# Patient Record
Sex: MALE | URBAN - METROPOLITAN AREA
[De-identification: names, ages, dates, MRNs, and addresses within clinical notes are randomized per-mention and may not be internally consistent; named-entity substitution may affect disease eponyms.]

---

## 2018-09-27 ENCOUNTER — TELEPHONE (OUTPATIENT)
Dept: FAMILY MEDICINE CLINIC | Age: 48
End: 2018-09-27

## 2018-09-27 DIAGNOSIS — E74.39 GLUCOSE INTOLERANCE: Primary | ICD-10-CM

## 2018-10-17 LAB
EST. AVERAGE GLUCOSE BLD GHB EST-MCNC: 126 MG/DL
HBA1C MFR BLD: 6 % (ref 4.8–5.6)

## 2018-10-24 ENCOUNTER — OFFICE VISIT (OUTPATIENT)
Dept: FAMILY MEDICINE CLINIC | Age: 48
End: 2018-10-24

## 2018-10-24 VITALS
TEMPERATURE: 98.3 F | RESPIRATION RATE: 16 BRPM | DIASTOLIC BLOOD PRESSURE: 85 MMHG | OXYGEN SATURATION: 96 % | HEART RATE: 86 BPM | HEIGHT: 72 IN | SYSTOLIC BLOOD PRESSURE: 160 MMHG | WEIGHT: 239 LBS | BODY MASS INDEX: 32.37 KG/M2

## 2018-10-24 DIAGNOSIS — Z23 ENCOUNTER FOR IMMUNIZATION: ICD-10-CM

## 2018-10-24 DIAGNOSIS — E11.9 CONTROLLED TYPE 2 DIABETES MELLITUS WITHOUT COMPLICATION, WITHOUT LONG-TERM CURRENT USE OF INSULIN (HCC): Primary | ICD-10-CM

## 2018-10-24 DIAGNOSIS — E78.00 HYPERCHOLESTEREMIA: ICD-10-CM

## 2018-10-24 DIAGNOSIS — I10 ESSENTIAL HYPERTENSION: ICD-10-CM

## 2018-10-24 PROBLEM — L71.9 ROSACEA: Status: ACTIVE | Noted: 2018-10-24

## 2018-10-24 RX ORDER — ROSUVASTATIN CALCIUM 5 MG/1
5 TABLET, COATED ORAL DAILY
Qty: 90 TAB | Refills: 1 | Status: SHIPPED | OUTPATIENT
Start: 2018-10-24 | End: 2019-06-03

## 2018-10-24 RX ORDER — METFORMIN HYDROCHLORIDE 500 MG/1
TABLET ORAL 2 TIMES DAILY WITH MEALS
COMMUNITY
End: 2018-12-27

## 2018-10-24 NOTE — PROGRESS NOTES
Yeyo Johnson is a 46 y.o. male presenting for DM2 and HTN Diabetes type: Type 2-oral agents Medications:  Metformin 500 mg 2 QD Medication compliance:    good, never misses Side effects of meds:  none Diet compliance:  Good compliance Exercise compliance:  yes Home testing:  no 
Fasting sugar range:  NA Other sugars later in day:  NA Hypoglycemia:  NA Last eye appt and physician:  due Checking feet:  yes Comorbid conditions:  HTN, 
 
Lab Results Component Value Date/Time Hemoglobin A1c 6.0 (H) 10/16/2018 09:12 AM  
 
 
HTN The patient presents today for HTN follow-up. Currently taking nothing. Taking medications daily w/o complications. Home BP readings range from OK at ortho. SIde effects of meds:  NA Comorbid conditions:  DM Aspirin?  no  
 
ROS Past Medical History:  
Diagnosis Date  Diabetes ([facility] [address] Past Surgical History:  
Procedure Laterality Date  HX COLONOSCOPY  2018 Family History Problem Relation Age of Onset  No Known Problems Mother  Cancer Father   
     prostate Social History Socioeconomic History  Marital status: UNKNOWN Spouse name: Not on file  Number of children: Not on file  Years of education: Not on file  Highest education level: Not on file Social Needs  Financial resource strain: Not on file  Food insecurity - worry: Not on file  Food insecurity - inability: Not on file  Transportation needs - medical: Not on file  Transportation needs - non-medical: Not on file Occupational History  Not on file Tobacco Use  Smoking status: Former Smoker Types: Cigarettes Last attempt to quit: 10/24/2013 Years since quittin.0  Smokeless tobacco: Never Used Substance and Sexual Activity  Alcohol use: Yes  Drug use: No  
 Sexual activity: Not on file Other Topics Concern  Not on file Social History Narrative  Not on file Current Outpatient Medications Medication Sig Dispense Refill  rosuvastatin (CRESTOR) 5 mg tablet Take 1 Tab by mouth daily. 90 Tab 1  
 metFORMIN (GLUCOPHAGE) 500 mg tablet Take  by mouth two (2) times daily (with meals). No Known Allergies Vitals:  
 10/24/18 1443 BP: 160/85 Pulse: 86 Resp: 16 Temp: 98.3 °F (36.8 °C) TempSrc: Oral  
SpO2: 96% Weight: 239 lb (108.4 kg) Height: 6' (1.829 m) Body mass index is 32.41 kg/m². Objective General: Patient alert and oriented and in NAD HEENT: PER/EOMI, no conjunctival pallor or scleral icterus. No thyromegaly or cervical lymphadenopathy Heart: Regular rate and rhythm, No murmurs, rubs or gallops. Lungs: Clear to auscultation bilaterally, no wheezing, rales or rhonchi 
  
Diabetic Foot Exam: 
Protective sensation by monofilament is intact bilaterally. Pedal pulses are 2+ and normal bilaterally. L foot skin inspection:  normal skin and soft tissue with no gross edema or evidence of acute injury or foot ulcer R foot skin inspection:  skin and soft tissue appear normal with no significant edema or evidence of acute injury or foot ulcer Ext: No edema Skin: No rashes or lesions noted on exposed skin Neuro: AAOx3 Psych: Appropriate mood and affect Assessment and Plan: 1. Controlled type 2 diabetes mellitus without complication, without long-term current use of insulin ([address] Good control on metformin only,  Diet again discussed, no sugar, lower carb with 12 hour overnight fast and 45 min of exercise daily 
-  DIABETES FOOT EXAM 
- METABOLIC PANEL, BASIC 2. Essential hypertension Not at goal.  Given BP parameters. Check home BP and see us if not at 140/90 or less - MICROALBUMIN, UR, RAND W/ MICROALB/CREAT RATIO 3. Encounter for immunization Given flu shot 
- INFLUENZA VIRUS VAC QUAD,SPLIT,PRESV FREE SYRINGE IM 4. Hypercholesteremia Start crestor 5 mg per day and FU labs after that - LIPID PANEL 
 
 Diagnosis and plan discussed with patient who verbillized understanding. Follow-up Disposition: 
Return in about 6 months (around 4/24/2019).  
 
[facility]

## 2018-10-24 NOTE — PROGRESS NOTES
Identified pt with two pt identifiers(name and ). Reviewed record in preparation for visit and have obtained necessary documentation. Chief Complaint Patient presents with  Diabetes Health Maintenance Due Topic  LIPID PANEL Q1   
 FOOT EXAM Q1   
 MICROALBUMIN Q1   
 EYE EXAM RETINAL OR DILATED Q1   
 Pneumococcal 19-64 Medium Risk (1 of 1 - PPSV23)  DTaP/Tdap/Td series (1 - Tdap)  Shingrix Vaccine Age 50> (1 of 2)  FOBT Q 1 YEAR AGE 50-75  Influenza Age 5 to Adult Coordination of Care Questionnaire: 
:  
1) Have you been to an emergency room, urgent care, or hospitalized since your last visit?   no If yes, where when, and reason for visit? 2. Have seen or consulted any other health care provider since your last visit? YES If yes, where when, and reason for visit? [facility] 3) Do you have an Advanced Directive/ Living Will in place? NO If yes, do we have a copy on file NO If no, would you like information NO Patient is accompanied by self I have received verbal consent from Rama August to discuss any/all medical information while they are present in the room.

## 2019-05-28 ENCOUNTER — TELEPHONE (OUTPATIENT)
Dept: FAMILY MEDICINE CLINIC | Age: 49
End: 2019-05-28

## 2019-05-28 DIAGNOSIS — E78.00 HYPERCHOLESTEREMIA: ICD-10-CM

## 2019-05-28 DIAGNOSIS — Z12.5 PROSTATE CANCER SCREENING: ICD-10-CM

## 2019-05-28 DIAGNOSIS — I10 ESSENTIAL HYPERTENSION: ICD-10-CM

## 2019-05-28 DIAGNOSIS — E11.9 CONTROLLED TYPE 2 DIABETES MELLITUS WITHOUT COMPLICATION, WITHOUT LONG-TERM CURRENT USE OF INSULIN (HCC): Primary | ICD-10-CM

## 2019-05-28 NOTE — TELEPHONE ENCOUNTER
Patient calling to see if he can get blood work ordered prior to appt on Monday (06/03/19) for his annual physical.  Can you order this?

## 2019-05-30 LAB
ALBUMIN SERPL-MCNC: 5.1 G/DL (ref 3.5–5.5)
ALBUMIN/CREAT UR: <6.5 MG/G CREAT (ref 0–30)
ALP SERPL-CCNC: 65 IU/L (ref 39–117)
ALT SERPL-CCNC: 16 IU/L (ref 0–44)
AST SERPL-CCNC: 19 IU/L (ref 0–40)
BASOPHILS # BLD AUTO: 0 X10E3/UL (ref 0–0.2)
BASOPHILS NFR BLD AUTO: 0 %
BILIRUB DIRECT SERPL-MCNC: 0.17 MG/DL (ref 0–0.4)
BILIRUB SERPL-MCNC: 0.6 MG/DL (ref 0–1.2)
BUN SERPL-MCNC: 16 MG/DL (ref 6–24)
BUN/CREAT SERPL: 18 (ref 9–20)
CALCIUM SERPL-MCNC: 9.3 MG/DL (ref 8.7–10.2)
CHLORIDE SERPL-SCNC: 102 MMOL/L (ref 96–106)
CHOLEST SERPL-MCNC: 162 MG/DL (ref 100–199)
CO2 SERPL-SCNC: 24 MMOL/L (ref 20–29)
CREAT SERPL-MCNC: 0.87 MG/DL (ref 0.76–1.27)
CREAT UR-MCNC: 46.5 MG/DL
EOSINOPHIL # BLD AUTO: 0.1 X10E3/UL (ref 0–0.4)
EOSINOPHIL NFR BLD AUTO: 1 %
ERYTHROCYTE [DISTWIDTH] IN BLOOD BY AUTOMATED COUNT: 13.2 % (ref 12.3–15.4)
EST. AVERAGE GLUCOSE BLD GHB EST-MCNC: 128 MG/DL
GLUCOSE SERPL-MCNC: 99 MG/DL (ref 65–99)
HBA1C MFR BLD: 6.1 % (ref 4.8–5.6)
HCT VFR BLD AUTO: 42.7 % (ref 37.5–51)
HDLC SERPL-MCNC: 47 MG/DL
HGB BLD-MCNC: 14.2 G/DL (ref 13–17.7)
IMM GRANULOCYTES # BLD AUTO: 0 X10E3/UL (ref 0–0.1)
IMM GRANULOCYTES NFR BLD AUTO: 0 %
LDLC SERPL CALC-MCNC: 83 MG/DL (ref 0–99)
LYMPHOCYTES # BLD AUTO: 2.6 X10E3/UL (ref 0.7–3.1)
LYMPHOCYTES NFR BLD AUTO: 33 %
MCH RBC QN AUTO: 31.1 PG (ref 26.6–33)
MCHC RBC AUTO-ENTMCNC: 33.3 G/DL (ref 31.5–35.7)
MCV RBC AUTO: 93 FL (ref 79–97)
MICROALBUMIN UR-MCNC: <3 UG/ML
MONOCYTES # BLD AUTO: 0.7 X10E3/UL (ref 0.1–0.9)
MONOCYTES NFR BLD AUTO: 9 %
NEUTROPHILS # BLD AUTO: 4.4 X10E3/UL (ref 1.4–7)
NEUTROPHILS NFR BLD AUTO: 57 %
PLATELET # BLD AUTO: 222 X10E3/UL (ref 150–450)
POTASSIUM SERPL-SCNC: 4.3 MMOL/L (ref 3.5–5.2)
PROT SERPL-MCNC: 7.3 G/DL (ref 6–8.5)
PSA SERPL-MCNC: 0.8 NG/ML (ref 0–4)
RBC # BLD AUTO: 4.57 X10E6/UL (ref 4.14–5.8)
SODIUM SERPL-SCNC: 142 MMOL/L (ref 134–144)
TRIGL SERPL-MCNC: 161 MG/DL (ref 0–149)
TSH SERPL DL<=0.005 MIU/L-ACNC: 1.81 UIU/ML (ref 0.45–4.5)
VLDLC SERPL CALC-MCNC: 32 MG/DL (ref 5–40)
WBC # BLD AUTO: 7.9 X10E3/UL (ref 3.4–10.8)

## 2019-06-03 ENCOUNTER — OFFICE VISIT (OUTPATIENT)
Dept: FAMILY MEDICINE CLINIC | Age: 49
End: 2019-06-03

## 2019-06-03 VITALS
HEIGHT: 73 IN | HEART RATE: 57 BPM | BODY MASS INDEX: 32.07 KG/M2 | OXYGEN SATURATION: 99 % | DIASTOLIC BLOOD PRESSURE: 90 MMHG | WEIGHT: 242 LBS | RESPIRATION RATE: 16 BRPM | SYSTOLIC BLOOD PRESSURE: 155 MMHG | TEMPERATURE: 98.1 F

## 2019-06-03 DIAGNOSIS — E78.00 HYPERCHOLESTEREMIA: ICD-10-CM

## 2019-06-03 DIAGNOSIS — Z00.00 ANNUAL PHYSICAL EXAM: ICD-10-CM

## 2019-06-03 DIAGNOSIS — E11.9 CONTROLLED TYPE 2 DIABETES MELLITUS WITHOUT COMPLICATION, WITHOUT LONG-TERM CURRENT USE OF INSULIN (HCC): ICD-10-CM

## 2019-06-03 DIAGNOSIS — Z23 ENCOUNTER FOR IMMUNIZATION: Primary | ICD-10-CM

## 2019-06-03 DIAGNOSIS — I10 ESSENTIAL HYPERTENSION: ICD-10-CM

## 2019-06-03 RX ORDER — METFORMIN HYDROCHLORIDE 500 MG/1
TABLET, EXTENDED RELEASE ORAL
Qty: 180 TAB | Refills: 1 | Status: SHIPPED | OUTPATIENT
Start: 2019-06-03 | End: 2019-10-21 | Stop reason: SDUPTHER

## 2019-06-03 RX ORDER — LISINOPRIL 10 MG/1
10 TABLET ORAL DAILY
Qty: 90 TAB | Refills: 1 | Status: SHIPPED | OUTPATIENT
Start: 2019-06-03 | End: 2019-12-30

## 2019-06-03 RX ORDER — LOVASTATIN 20 MG/1
20 TABLET ORAL
Qty: 90 TAB | Refills: 1 | Status: SHIPPED | OUTPATIENT
Start: 2019-06-03 | End: 2019-12-30

## 2019-06-03 NOTE — PATIENT INSTRUCTIONS
STOP the SUGAR    The first step in dietary efforts at weight loss is removing as much sugar from the diet as possible. Most dietary sugar is in the forms of table sugar (sucrose), fruit sugar (fructose) and milk sugar (lactose). But, as the picture above demonstrates, you need to watch labels to see if processed foods have added sugars under other names. To eliminate sucrose, eliminate sweet drinks entirely including soft drinks, sports drinks, lemonade, sweet tea and wine. Also, eliminate candy and make desserts a \"special occasion only treat\". Don't eat desserts with every meal or every night. Most fructose is found in fruit and this should be markedly limited. Fruit juice should be avoided. One piece of fruit daily is the limit. Berries are a good choice to eat as a garnish for other foods. Bananas and grapes should be avoided. Avoid high fructose corn syrup (an artificial sweetener). Milk sugar, or lactose, should be avoided as well. Milk is a good source of calcium but not for people struggling with weight issues. Put a little milk in your coffee or tea but otherwise avoid milk. How can you avoid sugar? For starters, don't buy it and don't bring it in the house. It won't tempt you that way! For more information:    Try the internet for videos about sugar addiction or try diet doctor.com. Carb Counting in Diabetes    Carbohydrate or carb counting is a method of calculating grams of carbohydrate consumed at meals and snacks. Foods that contain carbohydrate have the greatest effect on blood sugar compared to foods that contain protein or fat. A low carb diet has the greatest likelihood of promoting weight loss. What Foods Have Carbohydrate?   Foods that contain carbohydrate or carbs are:    -grains like wheat, rice, oatmeal, and barley  -grain-based foods like bread, cereal, pasta, and crackers  -starchy vegetables like potatoes, peas and corn  -fruit and juice  -milk and yogurt  -dried beans and peas and soy products like veggie burgers  -sweets and snack foods like sodas, juice drinks, cake, cookies, candy, and chips    Non-starchy vegetables like lettuce, cucumbers, broccoli, and cauliflower have very little carbohydrate and minimal impact on your blood glucose. Carbohydrate Servings    -In meal planning, 1 serving of a food with carbohydrate has about  15 grams of carbohydrate:  -Check serving sizes with measuring cups and spoons or a food scale. -Read the Nutrition Facts on food labels to find out how many grams of carbohydrate are in foods you eat.   -1 carb serving (15 grams) is roughly equal to one piece of bread    How much carbohydrate should I eat? Diabetics are advised to eat: For women-30-45 grams or 2-3 carb servings per meal.  For men-45-60 grams or 3-4 carb servings per meal.    For weight loss, patients should try to eat under 100 grams of carbs per day. How do I \"manage\" carbs?    -First, eliminate sugar in the form of soft drinks, candy and desserts. Minimize cakes, cookies, smoothies and juices.  -Next, identify the carbs you are eating. Watch labels and know when you are eating a starch. Eat less bread, noodles, pasta, rice, potatoes, french fries, cereals, chips, crackers and yogurt  -Eat more healthy proteins and fats with more eggs, full fat dairy products, non starchy vegetables, salads, meat, poultry, fish, cheese, berries, nuts, olive oil and butter.  -Avoid beer. Europeans refer to beer as \"liquid bread\" and it is made from grains with a high carb content. Where can I find more information? There is a lot of information about carb counting on the internet and there are books about carb counting. Try the American Diabetes Association and Dietdoctor. com        EXERCISE AND WEIGHT LOSS    You'll hear lots of different things about weight loss and exercise. There is controversy about how much exercise helps with weight loss.   We'll review what you should do about exercise and a weight plan here. First, it is hard to lose weight just with exercise. It takes about 3500 extra calories burned to lose a single pound. To put exercise in perspective, most people burn about 150 calories per mile if they walk or run. Doing the math, it takes over 23 miles to burn up the energy to lose one pound. So if you want to lose weight, exercise by itself is unlikely to help with weight loss very much. You need to work on diet and exercise at the same time to lose weight. And, you need to work on your habits and emotions since they have huge impacts on eating behaviors. But, exercise does help with weight loss. If you exercise, you burn stored fat in your muscles and that allows the levels of insulin in your body to fall. This allows the enzyme that burns fat to \"switch on\" and use stored fat for energy. That combined with a no sugar, lower starch diet combines to promote weight loss. Think of it this way:  exercise permits weight loss! Most people that lose weight and keep it off exercise. What's the right exercise? The best exercise is the one you enjoy and can keep doing! Exercise that makes you feel good physically and makes you feel good about yourself is ideal.      Exercise that elevates your heart rate for a sustained period such as running, biking,  walking and swimming improves your cardiovascular fitness. Resistance exercises such as weight lifting, strength training or calisthenics also clearly improve cardiovascular health and weight control. Stretching and yoga help with flexibility and balance. Ideally, an exercise program should include all of these different types of exercise. How much should I exercise? For weight loss, you should aim to exercise 45 minutes per day, 5-6 days per week.   When you exercise 45 minutes, you exhaust the supply of fat your muscles store for energy and you help you body turn on the enzyme that burns stored fat elsewhere. This is the minimum amount of exercise you should shoot for. Remember, you don't need to think of exercise as strictly an organized period of time where that's all you do. You can increase your exercise in all your daily activities. Walk more at work or school. If you can complete an errand by walking instead of driving, do it. Park farther away from the store if you go shopping and force yourself to walk farther. On breaks at work, walk around the office and greet your coworkers instead of sitting at your desk. Matthias Sa a few calories and enjoy the company of others. Go for a walk around the sports field while the kids practice sports. Take another parent with you. What are other benefits of exercise? While exercise helps you lose weight, it is helping you in lots of other ways. Exercise lowers your risk of diabetes and high blood pressure and makes your heart healthier. It lowers your risk of heart attacks. Exercise can help chronic lung disease and congestive heart failure improve. Exercise lowers the risk of dementia including Alzheimer's disease. Exercise lowers the risk of some cancers and decreases the risk of osteoporosis. And interestingly, exercise helps with emotionally health and lowers the risk and severity of emotional illnesses such as depression. Said simply, exercise helps you live longer and better. What will help me keep up the exercise? Remember that exercise is your gift to yourself and your family. So schedule time for your exercise and be selfish about guarding that time. It's yours! Exercise with a friend or friends and make exercise a social activity that you look forward to. Friends keep each other honest and accountable. Think of how you feel when you exercise. Most people just feel better physically and emotionally. Remember that feeling and try to recapture it.       Remember exercise will help you live longer and better and will help you be there for your children and grandchildren. Make that commitment for your family. And don't forget to tell yourself that while you feel better you look better! Silvestre Jimenez said it:  \"You look marvelous! \"          LOSE WEIGHT WHILE YOU SLEEP    Fasting    Fasting is part of a weight loss program.  Really?!?  Yes. Fasting has been part of the human condition forever. In our modern society, many of us rarely miss a meal but our ancestors often faced prolonged periods of food scarcity so our bodies store calories as fat for that possibility. Fasting, even for a short period, helps us lose weight by burning stored sugar and fat in our liver and \"switching on\" the enzymes that help us burn stored fat. How often should I fast?    You should fast every night! It's important to give our systems a rest from eating and we should fast every night between our evening meal and breakfast.  You should try to have at least 12 hours every night where you aren't eating at all. Longer fasts    You can consider longer periods of fasting to lose weight but first a few cautions. Make sure you stay well hydrated. Drink plenty of water. If you take medications for weight loss or diabetes, discuss fasting with your doctor first.        Sleep    You can lose weight while you sleep! It makes sense if you think about it. When you sleep, the body's machinery is still running and you aren't taking in any additional calories. And, research shows that good sleeping habits promote weight loss. Sleep too little and weight loss is more difficult. Ideally for weight loss, you should sleep 7-8 hours each night. Interestingly, if you sleep at a cooler temperature, you lose more weight. You body burns more calories to stay warm. So, what can I eat? 1) Protein-protein consumption promotes weight loss.   Eat protein at every meal.  Good sources of protein include meat, fish, poultry and eggs. 2) More soluble fiber-soluble fiber helps us feel \"full\" and helps improve many markers for cardiovascular disease. But, we have to watch out for products with added sugar or large carb servings! Sources of soluble fiber include oatmeal, root vegetables such as sweet potatoes, turnips and carrots, vegetables such as broccoli and Brussel sprouts. 3) Healthy fats and oils-certain fats are better for our blood vessels and cardiovascular system. The oils in fish and seafood tend to be better for us as well as olive oil and the nuts on trees (walnuts, almonds, pistachios and hazelnuts). So, again, try to eat more fish. Use olive oil for cooking and for salad dressings. Nuts can be used in salads and in other dishes and they make a good low carb snack. 4) Non starchy vegetables-Green and yellow vegetables offer a lot of nutrients and very low amounts of carbs that can lead to weight gain. Salads can add a lot to our diet and also pack in a lot of nutrients. Cautions:  avoid starchy vegetables such as potatoes, corn and peas. Also, be careful about what is added to vegetables such as fruit or salad dressings that contain sugar. 5) Full fat dairy products-Cheeses make a good snack or appetizer. Cottage cheese can be a good basis for breakfast.  Yogurt is a good addition to our diet but watch out for yogurt that has added sugar. Avoid milk.

## 2019-06-03 NOTE — PROGRESS NOTES
Beka Jim is a 44 y.o. male      Chief Complaint   Patient presents with    Complete Physical     Patient is fasting          1. Have you been to the ER, urgent care clinic since your last visit? Hospitalized since your last visit? No      2. Have you seen or consulted any other health care providers outside of the [address] [place] [address] since your last visit? Include any pap smears or colon screening.   No

## 2019-06-03 NOTE — PROGRESS NOTES
Assessment and Plan:    1. Encounter for immunization  updated  - diphtheria-pertussis, acellular,-tetanus (ACELL) 2.5-8-5 Lf-mcg-Lf/0.5mL susp susp; 0.5 mL by IntraMUSCular route once for 1 dose. Dispense: 0.5 mL; Refill: 0    2. Essential hypertension  Start ACE, FU 6 weeks for BP recheck. - lisinopril (PRINIVIL, ZESTRIL) 10 mg tablet; Take 1 Tab by mouth daily. Dispense: 90 Tab; Refill: 1    3. Hypercholesteremia  Retry different statin (Start 10 days after starting ACE)  - lovastatin (MEVACOR) 20 mg tablet; Take 1 Tab by mouth nightly. Dispense: 90 Tab; Refill: 1    4. Controlled type 2 diabetes mellitus without complication, without long-term current use of insulin (HCC)  At goal.  Diet exercise, foot care, all discussed    5. Annual physical exam  Safety discussed, cancer screening up to date      Diagnoses and plans were discussed with the patient. After visit summary given to the patient as well. Tali Goodson MD    Edward Chappell is a 44 y.o. male who had concerns including Complete Physical (Patient is fasting ). DM2 on metformin  Doing well A1c good  Does not check own sugars  Following diet  Due to see eye doctor     Cholesterol stopped crestor due to diarrhea    BP up today and on previous visits. Health maintenance:    Immunizations needed: Tdap or DT   Pneumovacc 23   Influenza vaccine    Cancer screening status   Colonoscopy 2017 repeat 5 years   PSA normal   Lung CT past smoker, has quit    AAA screening past smoker, will need in future    Cardiovascular risk factors:   Smoking past, quit 2011   HTN start treatment today   Cholesterol start treatment again today   Diabetes well controlled      Last eye exam  Will go to [facility]  Last dental exam sees regularly. Meds: several just added today.     Current Outpatient Medications:     diphtheria-pertussis, acellular,-tetanus (ACELL) 2.5-8-5 Lf-mcg-Lf/0.5mL susp susp, 0.5 mL by IntraMUSCular route once for 1 dose., Disp: 0.5 mL, Rfl: 0    lisinopril (PRINIVIL, ZESTRIL) 10 mg tablet, Take 1 Tab by mouth daily. , Disp: 90 Tab, Rfl: 1    lovastatin (MEVACOR) 20 mg tablet, Take 1 Tab by mouth nightly., Disp: 90 Tab, Rfl: 1    metFORMIN ER (GLUCOPHAGE XR) 500 mg tablet, TAKE 2 TABLETS BY MOUTH  ONCE DAILY, Disp: 180 Tab, Rfl: 1   Allergies:  No Known Allergies  Smoker:   Social History     Tobacco Use   Smoking Status Current Some Day Smoker    Types: Cigarettes, Pipe    Last attempt to quit: 10/24/2013    Years since quittin.6   Smokeless Tobacco Never Used     ETOH:   Social History     Substance and Sexual Activity   Alcohol Use Yes       FH:    Family History   Problem Relation Age of Onset    No Known Problems Mother     Cancer Father         prostate       ROS:  General/Constitutional:  No headache, fever, fatigue, weight loss or weight gain     Eyes:  No redness, pruritis, pain, visual changes, swelling, or discharge    Ears:  No pain, loss or changes in hearin    Neck:  No swelling, masses, stiffness, pain, or limited movemen    Cardiac:   No chest pain    Respiratory:  No cough or shortness of breath    GI:  No nausea/vomiting, diarrhea, abdominal pain, bloody or dark stools     :  No dysuria or  hematuria   Neurological:  No loss of consciousness, dizziness, seizures, dysarthria, cognitive changes, memory changes,  problems with balance, or unilateral weakness    Skin: No rash         Physical Exam:  Visit Vitals  /90   Pulse (!) 57   Temp 98.1 °F (36.7 °C) (Oral)   Resp 16   Ht 6' 1\" (1.854 m)   Wt 242 lb (109.8 kg)   SpO2 99%   BMI 31.93 kg/m²       Physical Examination: General appearance - alert, well appearing, and in no distress, oriented to person, place, and time and normal appearing weight  Mental status -  normal mood, behavior, speech, dress, motor activity, and thought processes, no expressed suicidal or homicidal ideation, no hallucinations  Ears - bilateral TM's and external ear canals normal  Nose - normal and patent, no erythema, discharge or polyps  Mouth - mucous membranes moist, pharynx normal without lesions  Neck - supple, no significant adenopathy  Chest - normal chest excursion, normal inspiratory and expiratory function. Clear to ausculation bilaterally. Heart - normal rate, regular rhythm, normal S1, S2, no murmurs, rubs, clicks or gallops, no extremity edema  Abdomen- benign, no organomegaly or masses bilateral small hernias, inguinal.  -normal penis and testicles, 2+ prostate  Skin-no rashes or suspicious moles  Heme negative stool  Neuro normal speech, moves all extremities, normal gait  Musculoskeletal- grossly normal joint and motor function.

## 2019-07-15 ENCOUNTER — OFFICE VISIT (OUTPATIENT)
Dept: FAMILY MEDICINE CLINIC | Age: 49
End: 2019-07-15

## 2019-07-15 VITALS
RESPIRATION RATE: 18 BRPM | WEIGHT: 242 LBS | BODY MASS INDEX: 32.07 KG/M2 | DIASTOLIC BLOOD PRESSURE: 82 MMHG | HEIGHT: 73 IN | SYSTOLIC BLOOD PRESSURE: 119 MMHG | TEMPERATURE: 98.3 F | HEART RATE: 64 BPM

## 2019-07-15 DIAGNOSIS — I10 ESSENTIAL HYPERTENSION: Primary | ICD-10-CM

## 2019-07-15 DIAGNOSIS — E78.00 HYPERCHOLESTEREMIA: ICD-10-CM

## 2019-07-15 DIAGNOSIS — E11.9 CONTROLLED TYPE 2 DIABETES MELLITUS WITHOUT COMPLICATION, WITHOUT LONG-TERM CURRENT USE OF INSULIN (HCC): ICD-10-CM

## 2019-07-15 NOTE — PROGRESS NOTES
Troy Pierre is a 44 y.o. male      Chief Complaint   Patient presents with    Medication Refill     Lab ordes          1. Have you been to the ER, urgent care clinic since your last visit? Hospitalized since your last visit? no      2. Have you seen or consulted any other health care providers outside of the [address] [place] [address] since your last visit? Include any pap smears or colon screening.  no

## 2019-07-15 NOTE — PATIENT INSTRUCTIONS
Diabetes: 
Blood sugar goals: 
Hemoglobin A1c under 7 Fasting blood sugar  Blood sugar 2 hours after a meal under 180, 4 hours after a meal under 120 No hypoglycemia (sugars under 70 and symptomatic low sugars) Blood sugar control with diet and exercise: 
Exercise 45 minutes per day. This makes your insulin work better. It also allows insulin levels to fall helping with weight loss. Every night, try to fast from your evening meal to breakfast (at least 12 hours) without eating anything. This uses stored energy in your liver and makes insulin work better. Avoid simples sugars such as table sugar in drinks (sodas, lemonade, sweet tea, wine), desserts, candy. Also avoid fruit juices and high fructose corn syrup. Finally, drink less milk which has milk sugar in it. Control your starch intake. Men should have 3-4 carb portions per meal.  Women should have 2-3 carb portions per meal.  A carb serving is the equivalent of a slice of bread. Control your blood pressure and cholesterol. These problems are common in diabetes. AND, don't smoke. All of these problems contribute to heart disease and stroke risk. Get a yearly eye exam and flu shot. Make sure your vaccines are up to date particularly the pneumonia vaccines. Inspect your feet daily. Wear comfortable protective shoes and clean socks. Seek medical care if there are sore, calluses or numbness and burning of your feet. See your doctor at least every 6 months if you are on oral medicines or more often if the diabetic control is not at goal or if you are on insulin. Take your medicines religiously. The goal blood pressure for most patients is 140/90. The whole point of treating blood pressure is to prevent strokes, heart attacks and kidney damage. Persistently elevated blood pressure damages blood vessels and can lead to catastrophic heart problems and strokes. Recommendations for Hypertension (elevated blood pressure): · Purchase a blood pressure cuff that goes around your upper arm and check blood pressure at least 3 times per week. Write down your numbers for review. Check blood pressure in the morning and evening. Rest for 5 minutes with feet elevated and arm resting on a table (at mid-chest level) when checking blood pressure · Exercise 30-60 minutes most days of the week, preferably with a mix of cardiovascular and strength training. Exercise can improve blood pressure, even if you do not lose weight! · Limit alcohol intake to less than 3-4 drinks per week. · Avoid tobacco products · Avoid illegal drugs especially amphetamines · DASH diet - includes fruits, vegetables, fiber, and less than 2000 mg sodium (salt) daily. · Try to eat a low sugar diet. Sugar worsens diabetes and activates kidney hormones that raise blood pressure. · Avoid non-steroidal inflammatory medications (NSAIDS) such as ibuprofen, advil, motrin, aleve, and naproxyn as these may increase blood pressure if used long-term · Avoid OTC decongestants such as pseudopherine, phenylephrine, Afrin · Take your blood pressure medicine (and aspirin if prescribed) religiously

## 2019-12-09 ENCOUNTER — TELEPHONE (OUTPATIENT)
Dept: FAMILY MEDICINE CLINIC | Age: 49
End: 2019-12-09

## 2019-12-09 DIAGNOSIS — E11.9 CONTROLLED TYPE 2 DIABETES MELLITUS WITHOUT COMPLICATION, WITHOUT LONG-TERM CURRENT USE OF INSULIN (HCC): ICD-10-CM

## 2019-12-09 DIAGNOSIS — I10 ESSENTIAL HYPERTENSION: ICD-10-CM

## 2019-12-09 DIAGNOSIS — E11.9 CONTROLLED TYPE 2 DIABETES MELLITUS WITHOUT COMPLICATION, WITHOUT LONG-TERM CURRENT USE OF INSULIN (HCC): Primary | ICD-10-CM

## 2019-12-28 DIAGNOSIS — E78.00 HYPERCHOLESTEREMIA: ICD-10-CM

## 2019-12-28 DIAGNOSIS — I10 ESSENTIAL HYPERTENSION: ICD-10-CM

## 2019-12-30 RX ORDER — LOVASTATIN 20 MG/1
TABLET ORAL
Qty: 90 TAB | Refills: 1 | Status: SHIPPED | OUTPATIENT
Start: 2019-12-30 | End: 2020-01-13 | Stop reason: SDUPTHER

## 2019-12-30 RX ORDER — LISINOPRIL 10 MG/1
TABLET ORAL
Qty: 90 TAB | Refills: 1 | Status: SHIPPED | OUTPATIENT
Start: 2019-12-30 | End: 2020-01-13 | Stop reason: SDUPTHER

## 2020-01-10 LAB
BASOPHILS # BLD AUTO: 0.1 X10E3/UL (ref 0–0.2)
BASOPHILS NFR BLD AUTO: 1 %
BUN SERPL-MCNC: 13 MG/DL (ref 6–24)
BUN/CREAT SERPL: 13 (ref 9–20)
CALCIUM SERPL-MCNC: 10 MG/DL (ref 8.7–10.2)
CHLORIDE SERPL-SCNC: 100 MMOL/L (ref 96–106)
CO2 SERPL-SCNC: 25 MMOL/L (ref 20–29)
CREAT SERPL-MCNC: 0.99 MG/DL (ref 0.76–1.27)
EOSINOPHIL # BLD AUTO: 0.1 X10E3/UL (ref 0–0.4)
EOSINOPHIL NFR BLD AUTO: 2 %
ERYTHROCYTE [DISTWIDTH] IN BLOOD BY AUTOMATED COUNT: 12.4 % (ref 11.6–15.4)
EST. AVERAGE GLUCOSE BLD GHB EST-MCNC: 131 MG/DL
GLUCOSE SERPL-MCNC: 123 MG/DL (ref 65–99)
HBA1C MFR BLD: 6.2 % (ref 4.8–5.6)
HCT VFR BLD AUTO: 40 % (ref 37.5–51)
HGB BLD-MCNC: 13.9 G/DL (ref 13–17.7)
IMM GRANULOCYTES # BLD AUTO: 0 X10E3/UL (ref 0–0.1)
IMM GRANULOCYTES NFR BLD AUTO: 0 %
LYMPHOCYTES # BLD AUTO: 2.2 X10E3/UL (ref 0.7–3.1)
LYMPHOCYTES NFR BLD AUTO: 41 %
MCH RBC QN AUTO: 31.9 PG (ref 26.6–33)
MCHC RBC AUTO-ENTMCNC: 34.8 G/DL (ref 31.5–35.7)
MCV RBC AUTO: 92 FL (ref 79–97)
MONOCYTES # BLD AUTO: 0.7 X10E3/UL (ref 0.1–0.9)
MONOCYTES NFR BLD AUTO: 14 %
NEUTROPHILS # BLD AUTO: 2.3 X10E3/UL (ref 1.4–7)
NEUTROPHILS NFR BLD AUTO: 42 %
PLATELET # BLD AUTO: 226 X10E3/UL (ref 150–450)
POTASSIUM SERPL-SCNC: 4.7 MMOL/L (ref 3.5–5.2)
RBC # BLD AUTO: 4.36 X10E6/UL (ref 4.14–5.8)
SODIUM SERPL-SCNC: 139 MMOL/L (ref 134–144)
WBC # BLD AUTO: 5.4 X10E3/UL (ref 3.4–10.8)

## 2020-01-13 ENCOUNTER — OFFICE VISIT (OUTPATIENT)
Dept: FAMILY MEDICINE CLINIC | Age: 50
End: 2020-01-13

## 2020-01-13 VITALS
BODY MASS INDEX: 32.84 KG/M2 | WEIGHT: 247.8 LBS | TEMPERATURE: 98.4 F | OXYGEN SATURATION: 97 % | SYSTOLIC BLOOD PRESSURE: 136 MMHG | DIASTOLIC BLOOD PRESSURE: 87 MMHG | HEART RATE: 51 BPM | HEIGHT: 73 IN | RESPIRATION RATE: 16 BRPM

## 2020-01-13 DIAGNOSIS — E78.00 HYPERCHOLESTEREMIA: ICD-10-CM

## 2020-01-13 DIAGNOSIS — I10 ESSENTIAL HYPERTENSION: ICD-10-CM

## 2020-01-13 DIAGNOSIS — E11.9 CONTROLLED TYPE 2 DIABETES MELLITUS WITHOUT COMPLICATION, WITHOUT LONG-TERM CURRENT USE OF INSULIN (HCC): Primary | ICD-10-CM

## 2020-01-13 RX ORDER — METFORMIN HYDROCHLORIDE 500 MG/1
TABLET, EXTENDED RELEASE ORAL
Qty: 180 TAB | Refills: 1 | Status: SHIPPED | OUTPATIENT
Start: 2020-01-13 | End: 2020-04-09

## 2020-01-13 RX ORDER — LOVASTATIN 20 MG/1
TABLET ORAL
Qty: 90 TAB | Refills: 1 | Status: SHIPPED | OUTPATIENT
Start: 2020-01-13 | End: 2020-07-22

## 2020-01-13 RX ORDER — LISINOPRIL 10 MG/1
TABLET ORAL
Qty: 90 TAB | Refills: 1 | Status: SHIPPED | OUTPATIENT
Start: 2020-01-13 | End: 2020-07-22

## 2020-01-13 NOTE — PROGRESS NOTES
Assessment and Plan    1. Hypercholesteremia  Continue statin  - lovastatin (MEVACOR) 20 mg tablet; TAKE 1 TABLET BY MOUTH EVERY NIGHT  Dispense: 90 Tab; Refill: 1    2. Essential hypertension  At goal  - lisinopril (PRINIVIL, ZESTRIL) 10 mg tablet; TAKE 1 TABLET BY MOUTH DAILY  Dispense: 90 Tab; Refill: 1    3. Controlled type 2 diabetes mellitus without complication, without long-term current use of insulin ([address]  Continue work on diet, doing well. - metFORMIN ER (GLUCOPHAGE XR) 500 mg tablet; TAKE 2 TABLETS BY MOUTH  ONCE DAILY  Dispense: 180 Tab; Refill: 1      Follow-up and Dispositions    · Return in about 6 months (around 7/13/2020). Diagnosis and plan discussed with patient who verbillized understanding. History of present Melania Little is a 44 y.o. male presenting for Hypertension and Diabetes    DM2  Sticking to diet  Watching sugars and starches  A1c 6.2   Continues to exercise  Eyes are up to date. No problems with feet. HTN  BP high normal   OK at home    Hypercholesterolemia  Remains on statin. Review of Systems   Respiratory: Negative for shortness of breath. Cardiovascular: Negative for chest pain and palpitations. Musculoskeletal: Negative for joint pain. Skin: Negative for rash. Psychiatric/Behavioral: Negative. Past Medical History:   Diagnosis Date    Diabetes ([address]     Hypercholesteremia     Hypertension     Inguinal hernia     bilateral unrepaired     Past Surgical History:   Procedure Laterality Date    HX COLONOSCOPY  04/2018    Polyp, repeat in 5 years, Dr. Mario Parnell.      Family History   Problem Relation Age of Onset    No Known Problems Mother     Cancer Father         prostate     Social History     Socioeconomic History    Marital status: UNKNOWN     Spouse name: Not on file    Number of children: Not on file    Years of education: Not on file    Highest education level: Not on file   Occupational History    Not on file Social Needs    Financial resource strain: Not on file    Food insecurity:     Worry: Not on file     Inability: Not on file    Transportation needs:     Medical: Not on file     Non-medical: Not on file   Tobacco Use    Smoking status: Current Some Day Smoker     Types: Cigarettes, Pipe     Last attempt to quit: 10/24/2013     Years since quittin.2    Smokeless tobacco: Never Used   Substance and Sexual Activity    Alcohol use: Yes    Drug use: No    Sexual activity: Yes   Lifestyle    Physical activity:     Days per week: Not on file     Minutes per session: Not on file    Stress: Not on file   Relationships    Social connections:     Talks on phone: Not on file     Gets together: Not on file     Attends [employer] service: Not on file     Active member of club or organization: Not on file     Attends meetings of clubs or organizations: Not on file     Relationship status: Not on file    Intimate partner violence:     Fear of current or ex partner: Not on file     Emotionally abused: Not on file     Physically abused: Not on file     Forced sexual activity: Not on file   Other Topics Concern    Not on file   Social History Narrative    Not on file         Prior to Admission medications    Medication Sig Start Date End Date Taking?  Authorizing Provider   metFORMIN ER (GLUCOPHAGE XR) 500 mg tablet TAKE 2 TABLETS BY MOUTH  ONCE DAILY 20  Yes Rudean Aase, MD   lovastatin (MEVACOR) 20 mg tablet TAKE 1 TABLET BY MOUTH EVERY NIGHT 20  Yes Rudean Aase, MD   lisinopril (PRINIVIL, ZESTRIL) 10 mg tablet TAKE 1 TABLET BY MOUTH DAILY 20  Yes Rudean Aase, MD   lisinopril (PRINIVIL, ZESTRIL) 10 mg tablet TAKE 1 TABLET BY MOUTH DAILY 19  Rudean Aase, MD   lovastatin (MEVACOR) 20 mg tablet TAKE 1 TABLET BY MOUTH EVERY NIGHT 19  Rudean Aase, MD   metFORMIN ER (GLUCOPHAGE XR) 500 mg tablet TAKE 2 TABLETS BY MOUTH  ONCE DAILY 10/22/19 1/13/20  Connor Michelle MD        No Known Allergies    Vitals:    01/13/20 1307   BP: 136/87   Pulse: (!) 51   Resp: 16   Temp: 98.4 °F (36.9 °C)   TempSrc: Oral   SpO2: 97%   Weight: 247 lb 12.8 oz (112.4 kg)   Height: 6' 1\" (1.854 m)     Body mass index is 32.69 kg/m². Objective  Physical Exam  Vitals signs and nursing note reviewed. Constitutional:       Appearance: Normal appearance. He is not toxic-appearing. HENT:      Head: Normocephalic and atraumatic. Neck:      Musculoskeletal: No muscular tenderness. Cardiovascular:      Rate and Rhythm: Normal rate and regular rhythm. Heart sounds: Normal heart sounds. No murmur. No gallop. Pulmonary:      Effort: Pulmonary effort is normal. No respiratory distress. Breath sounds: Normal breath sounds. No wheezing, rhonchi or rales. Lymphadenopathy:      Cervical: No cervical adenopathy. Neurological:      Mental Status: He is alert. Psychiatric:         Mood and Affect: Mood normal.         Behavior: Behavior normal.         Thought Content: Thought content normal.         Judgment: Judgment normal.          Diabetic Foot Exam:  Protective sensation is intact bilaterally. Pedal pulses are 2+ and normal bilaterally.   L foot skin inspection:  normal skin and soft tissue with no gross edema or evidence of acute injury or foot ulcer  R foot skin inspection:  skin and soft tissue appear normal with no significant edema or evidence of acute injury or foot ulcer

## 2020-01-13 NOTE — PROGRESS NOTES
Identified pt with two pt identifiers(name and ). Reviewed record in preparation for visit and have obtained necessary documentation. Chief Complaint   Patient presents with    Hypertension    Diabetes        Health Maintenance Due   Topic    EYE EXAM RETINAL OR DILATED     Shingrix Vaccine Age 50> (1 of 2)    FOBT Q 1 YEAR AGE 54-65     DTaP/Tdap/Td series (2 - Td)    Influenza Age 5 to Adult     FOOT EXAM Q1        Coordination of Care Questionnaire:  :   1) Have you been to an emergency room, urgent care, or hospitalized since your last visit? If yes, where when, and reason for visit? No       2. Have seen or consulted any other health care provider since your last visit? If yes, where when, and reason for visit?   No

## 2020-08-24 ENCOUNTER — TELEPHONE (OUTPATIENT)
Dept: FAMILY MEDICINE CLINIC | Age: 50
End: 2020-08-24

## 2020-08-24 DIAGNOSIS — E78.00 HYPERCHOLESTEREMIA: ICD-10-CM

## 2020-08-24 DIAGNOSIS — I10 ESSENTIAL HYPERTENSION: ICD-10-CM

## 2020-08-24 DIAGNOSIS — Z12.5 SCREENING FOR MALIGNANT NEOPLASM OF PROSTATE: Primary | ICD-10-CM

## 2020-08-24 DIAGNOSIS — E11.9 CONTROLLED TYPE 2 DIABETES MELLITUS WITHOUT COMPLICATION, WITHOUT LONG-TERM CURRENT USE OF INSULIN (HCC): ICD-10-CM

## 2020-08-24 NOTE — TELEPHONE ENCOUNTER
Pt would like to have labs prio to visit on 9/1/2020 for Annual     He would like for them to be faxed to [facility] on [id number] [address] [address].  At [phone]

## 2020-08-27 LAB
ALBUMIN SERPL-MCNC: 5 G/DL (ref 3.8–4.9)
ALBUMIN/CREAT UR: <11 MG/G CREAT (ref 0–29)
ALP SERPL-CCNC: 62 IU/L (ref 39–117)
ALT SERPL-CCNC: 26 IU/L (ref 0–44)
AST SERPL-CCNC: 25 IU/L (ref 0–40)
BASOPHILS # BLD AUTO: 0.1 X10E3/UL (ref 0–0.2)
BASOPHILS NFR BLD AUTO: 1 %
BILIRUB DIRECT SERPL-MCNC: 0.11 MG/DL (ref 0–0.4)
BILIRUB SERPL-MCNC: 0.4 MG/DL (ref 0–1.2)
BUN SERPL-MCNC: 20 MG/DL (ref 6–24)
BUN/CREAT SERPL: 23 (ref 9–20)
CALCIUM SERPL-MCNC: 10 MG/DL (ref 8.7–10.2)
CHLORIDE SERPL-SCNC: 101 MMOL/L (ref 96–106)
CHOLEST SERPL-MCNC: 193 MG/DL (ref 100–199)
CO2 SERPL-SCNC: 26 MMOL/L (ref 20–29)
CREAT SERPL-MCNC: 0.88 MG/DL (ref 0.76–1.27)
CREAT UR-MCNC: 28.5 MG/DL
EOSINOPHIL # BLD AUTO: 0.1 X10E3/UL (ref 0–0.4)
EOSINOPHIL NFR BLD AUTO: 2 %
ERYTHROCYTE [DISTWIDTH] IN BLOOD BY AUTOMATED COUNT: 12.5 % (ref 11.6–15.4)
EST. AVERAGE GLUCOSE BLD GHB EST-MCNC: 134 MG/DL
GLUCOSE SERPL-MCNC: 150 MG/DL (ref 65–99)
HBA1C MFR BLD: 6.3 % (ref 4.8–5.6)
HCT VFR BLD AUTO: 42.4 % (ref 37.5–51)
HDLC SERPL-MCNC: 45 MG/DL
HGB BLD-MCNC: 14.2 G/DL (ref 13–17.7)
IMM GRANULOCYTES # BLD AUTO: 0 X10E3/UL (ref 0–0.1)
IMM GRANULOCYTES NFR BLD AUTO: 1 %
LDLC SERPL CALC-MCNC: 99 MG/DL (ref 0–99)
LYMPHOCYTES # BLD AUTO: 2.2 X10E3/UL (ref 0.7–3.1)
LYMPHOCYTES NFR BLD AUTO: 32 %
MCH RBC QN AUTO: 31.1 PG (ref 26.6–33)
MCHC RBC AUTO-ENTMCNC: 33.5 G/DL (ref 31.5–35.7)
MCV RBC AUTO: 93 FL (ref 79–97)
MICROALBUMIN UR-MCNC: <3 UG/ML
MONOCYTES # BLD AUTO: 0.8 X10E3/UL (ref 0.1–0.9)
MONOCYTES NFR BLD AUTO: 12 %
NEUTROPHILS # BLD AUTO: 3.6 X10E3/UL (ref 1.4–7)
NEUTROPHILS NFR BLD AUTO: 52 %
PLATELET # BLD AUTO: 207 X10E3/UL (ref 150–450)
POTASSIUM SERPL-SCNC: 4.5 MMOL/L (ref 3.5–5.2)
PROT SERPL-MCNC: 7.4 G/DL (ref 6–8.5)
PSA SERPL-MCNC: 0.7 NG/ML (ref 0–4)
RBC # BLD AUTO: 4.57 X10E6/UL (ref 4.14–5.8)
SODIUM SERPL-SCNC: 138 MMOL/L (ref 134–144)
TRIGL SERPL-MCNC: 246 MG/DL (ref 0–149)
VLDLC SERPL CALC-MCNC: 49 MG/DL (ref 5–40)
WBC # BLD AUTO: 6.8 X10E3/UL (ref 3.4–10.8)

## 2020-09-02 ENCOUNTER — OFFICE VISIT (OUTPATIENT)
Dept: FAMILY MEDICINE CLINIC | Age: 50
End: 2020-09-02
Payer: COMMERCIAL

## 2020-09-02 VITALS
BODY MASS INDEX: 32.92 KG/M2 | WEIGHT: 248.4 LBS | HEART RATE: 60 BPM | HEIGHT: 73 IN | SYSTOLIC BLOOD PRESSURE: 117 MMHG | RESPIRATION RATE: 16 BRPM | TEMPERATURE: 98.5 F | OXYGEN SATURATION: 97 % | DIASTOLIC BLOOD PRESSURE: 74 MMHG

## 2020-09-02 DIAGNOSIS — E11.9 CONTROLLED TYPE 2 DIABETES MELLITUS WITHOUT COMPLICATION, WITHOUT LONG-TERM CURRENT USE OF INSULIN (HCC): ICD-10-CM

## 2020-09-02 DIAGNOSIS — I10 ESSENTIAL HYPERTENSION: ICD-10-CM

## 2020-09-02 DIAGNOSIS — Z00.00 ANNUAL PHYSICAL EXAM: Primary | ICD-10-CM

## 2020-09-02 DIAGNOSIS — E78.00 HYPERCHOLESTEREMIA: ICD-10-CM

## 2020-09-02 PROCEDURE — 99396 PREV VISIT EST AGE 40-64: CPT | Performed by: FAMILY MEDICINE

## 2020-09-02 PROCEDURE — 99213 OFFICE O/P EST LOW 20 MIN: CPT | Performed by: FAMILY MEDICINE

## 2020-09-02 RX ORDER — LISINOPRIL 10 MG/1
TABLET ORAL
Qty: 90 TAB | Refills: 1 | Status: SHIPPED | OUTPATIENT
Start: 2020-09-02 | End: 2021-05-05 | Stop reason: SDUPTHER

## 2020-09-02 RX ORDER — LOVASTATIN 20 MG/1
TABLET ORAL
Qty: 90 TAB | Refills: 1 | Status: SHIPPED | OUTPATIENT
Start: 2020-09-02 | End: 2021-03-05

## 2020-09-02 RX ORDER — METFORMIN HYDROCHLORIDE 500 MG/1
1000 TABLET, EXTENDED RELEASE ORAL DAILY
Qty: 180 TAB | Refills: 1 | Status: SHIPPED | OUTPATIENT
Start: 2020-09-02 | End: 2020-10-12 | Stop reason: ALTCHOICE

## 2020-09-02 NOTE — PATIENT INSTRUCTIONS
Diabetes: 
Blood sugar goals: 
Hemoglobin A1c under 7 Fasting blood sugar  Blood sugar 2 hours after a meal under 180, 4 hours after a meal under 120 No hypoglycemia (sugars under 70 and symptomatic low sugars) Blood sugar control with diet and exercise: 
Exercise 45 minutes per day. This makes your insulin work better. It also allows insulin levels to fall helping with weight loss. Every night, try to fast from your evening meal to breakfast (at least 12 hours) without eating anything. This uses stored energy in your liver and makes insulin work better. Avoid simples sugars such as table sugar in drinks (sodas, lemonade, sweet tea, wine), desserts, candy. Also avoid fruit juices and high fructose corn syrup. Avoid frequent consumption of fruit especially grapes and bananas. A single serving of fruit daily is all you should have. Finally, drink less milk which has milk sugar in it. Control your starch intake. Men should have 3-4 carb portions per meal.  Women should have 2-3 carb portions per meal.  A carb serving is the equivalent of a slice of bread. Control your blood pressure and cholesterol. These problems are common in diabetes. AND, don't smoke. All of these problems contribute to heart disease and stroke risk. Get a yearly eye exam and flu shot. Make sure your vaccines are up to date particularly the pneumonia vaccines. Inspect your feet daily. Wear comfortable protective shoes and clean socks. Seek medical care if there are sore, calluses or numbness and burning of your feet. See your doctor at least every 6 months if you are on oral medicines or more often if the diabetic control is not at goal or if you are on insulin. Take your medicines religiously. STOP the SUGAR The first step in dietary efforts at weight loss is removing as much sugar from the diet as possible.   Most dietary sugar is in the forms of table sugar (sucrose), fruit sugar (fructose) and milk sugar (lactose). But, as the picture above demonstrates, you need to watch labels to see if processed foods have added sugars under other names. To eliminate sucrose, eliminate sweet drinks entirely including soft drinks, sports drinks, lemonade, sweet tea and wine. Also, eliminate candy and make desserts a \"special occasion only treat\". Don't eat desserts with every meal or every night. Most fructose is found in fruit and this should be markedly limited. Fruit juice should be avoided. One piece of fruit daily is the limit. Berries are a good choice to eat as a garnish for other foods. Bananas and grapes should be avoided. Avoid high fructose corn syrup (an artificial sweetener). Milk sugar, or lactose, should be avoided as well. Milk is a good source of calcium but not for people struggling with weight issues. Put a little milk in your coffee or tea but otherwise avoid milk. How can you avoid sugar? For starters, don't buy it and don't bring it in the house. It won't tempt you that way! For more information: 
 
Try the internet for videos about sugar addiction or try diet doctor.com. Carb Counting in Diabetes Carbohydrate or carb counting is a method of calculating grams of carbohydrate consumed at meals and snacks. Foods that contain carbohydrate have the greatest effect on blood sugar compared to foods that contain protein or fat. A low carb diet has the greatest likelihood of promoting weight loss. What Foods Have Carbohydrate? Foods that contain carbohydrate or carbs are: 
 
-grains like wheat, rice, oatmeal, and barley 
-grain-based foods like bread, cereal, pasta, and crackers 
-starchy vegetables like potatoes, peas and corn 
-fruit and juice 
-milk and yogurt 
-dried beans and peas and soy products like veggie burgers 
-sweets and snack foods like sodas, juice drinks, cake, cookies, candy, and chips Non-starchy vegetables like lettuce, cucumbers, broccoli, and cauliflower have very little carbohydrate and minimal impact on your blood glucose. Carbohydrate Servings 
 
-In meal planning, 1 serving of a food with carbohydrate has about 15 grams of carbohydrate: 
-Check serving sizes with measuring cups and spoons or a food scale. -Read the Nutrition Facts on food labels to find out how many grams of carbohydrate are in foods you eat.  
-1 carb serving (15 grams) is roughly equal to one piece of bread How much carbohydrate should I eat? Diabetics are advised to eat: For women-30-45 grams or 2-3 carb servings per meal. 
For men-45-60 grams or 3-4 carb servings per meal. 
 
For weight loss, patients should try to eat under 100 grams of carbs per day. How do I \"manage\" carbs? 
 
-First, eliminate sugar in the form of soft drinks, candy and desserts. Minimize cakes, cookies, smoothies and juices. 
-Next, identify the carbs you are eating. Watch labels and know when you are eating a starch. Eat less bread, noodles, pasta, rice, potatoes, french fries, cereals, chips, crackers and yogurt 
-Eat more healthy proteins and fats with more eggs, full fat dairy products, non starchy vegetables, salads, meat, poultry, fish, cheese, berries, nuts, olive oil and butter. 
-Avoid beer. Europeans refer to beer as \"liquid bread\" and it is made from grains with a high carb content. Where can I find more information? There is a lot of information about carb counting on the internet and there are books about carb counting. Try the American Diabetes Association and Dietdoctor. com So, what can I eat? 1) Protein-protein consumption promotes weight loss. Eat protein at every meal.  Good sources of protein include meat, fish, poultry and eggs. 2) More soluble fiber-soluble fiber helps us feel \"full\" and helps improve many markers for cardiovascular disease.   But, we have to watch out for products with added sugar or large carb servings! Sources of soluble fiber include oatmeal, root vegetables such as sweet potatoes, turnips and carrots, vegetables such as broccoli and Brussel sprouts. 3) Healthy fats and oils-certain fats are better for our blood vessels and cardiovascular system. The oils in fish and seafood tend to be better for us as well as olive oil and the nuts on trees (walnuts, almonds, pistachios and hazelnuts). So, again, try to eat more fish. Use olive oil for cooking and for salad dressings. Nuts can be used in salads and in other dishes and they make a good low carb snack. 4) Non starchy vegetables-Green and yellow vegetables offer a lot of nutrients and very low amounts of carbs that can lead to weight gain. Salads can add a lot to our diet and also pack in a lot of nutrients. Cautions:  avoid starchy vegetables such as potatoes, corn and peas. Also, be careful about what is added to vegetables such as fruit or salad dressings that contain sugar. 5) Full fat dairy products-Cheeses make a good snack or appetizer. Cottage cheese can be a good basis for breakfast.  Yogurt is a good addition to our diet but watch out for yogurt that has added sugar. Avoid milk.

## 2020-09-02 NOTE — PROGRESS NOTES
Assessment and Plan:    1. Controlled type 2 diabetes mellitus without complication, without long-term current use of insulin (HCC)  Well controlled  Diet again discussed  - metFORMIN ER (GLUCOPHAGE XR) 500 mg tablet; Take 2 Tabs by mouth daily. Dispense: 180 Tab; Refill: 1    2. Hypercholesteremia  Continue statin  - lovastatin (MEVACOR) 20 mg tablet; TAKE ONE TABLET BY MOUTH AT BEDTIME  Dispense: 90 Tab; Refill: 1    3. Essential hypertension  At goal  - lisinopriL (PRINIVIL, ZESTRIL) 10 mg tablet; TAKE ONE TABLET BY MOUTH ONE TIME DAILY  Dispense: 90 Tab; Refill: 1    4. Annual physical exam  Cancer screening up to date PSA and TERENCE normal.  - varicella-zoster recombinant, PF, (SHINGRIX) 50 mcg/0.5 mL susr injection; 0.5 mL by IntraMUSCular route once for 1 dose. Repeat second dose in 6 months. Dispense: 0.5 mL; Refill: 1  Get eye exam    Diet, exercise and safety discussed with patient. Diagnoses and plans were discussed with the patient. After visit summary given to the patient as well. Follow-up and Dispositions    · Return in about 6 months (around 3/2/2021) for Diabetes follow up, Blood pressure follow up, Medication follow up. Kristen De León MD      [facility] is a 44 y.o. male who had concerns including Complete Physical and Medication Refill. Hypertension  Good control    DM2   On metformin  Sticking to diet for the most part  No sugar, less starch, some bread and pasta. Needs eye appt  Feet are good.     Non smoker    On statin        Health Maintenance  Immunizations:    Influenza: he will plan on getting it this fall   Tetanus:done 2019   Shingles: due for Shingrix - script provided   Pneumovacc: up to date     Cancer screening:     Prostate: guidelines reviewed, will do PSA and TERENCE today   Colon: guidelines reviewed, up to date    Lung:  guideline reviewed, not indicated    Smoking:  past smoker      ETOH:  moderate consumption-regular use but less than 2 drinks per night    Drugs:  Never user    Sexual history:  Monogamous heterosexual    Eye exam and Glaucoma Screening: advised to have exam    Last dental exam:  overdue-advised to see dentist      The following sections were reviewed & updated as appropriate: PMH, PSH, FH, and SH. Patient Active Problem List   Diagnosis Code    Hypertension I10    Rosacea L71.9    Diabetes mellitus type 2, controlled ([facility] [address] E11.9    Hypercholesteremia E78.00          Prior to Admission medications    Medication Sig Start Date End Date Taking? Authorizing Provider   metFORMIN ER (GLUCOPHAGE XR) 500 mg tablet Take 2 Tabs by mouth daily. 9/2/20  Yes Micheal Boucher MD   lovastatin (MEVACOR) 20 mg tablet TAKE ONE TABLET BY MOUTH AT BEDTIME 9/2/20  Yes Micheal Boucher MD   lisinopriL (PRINIVIL, ZESTRIL) 10 mg tablet TAKE ONE TABLET BY MOUTH ONE TIME DAILY 9/2/20  Yes Micheal Boucher MD   varicella-zoster recombinant, PF, (SHINGRIX) 50 mcg/0.5 mL susr injection 0.5 mL by IntraMUSCular route once for 1 dose. Repeat second dose in 6 months. 9/2/20 9/2/20 Yes Micheal Boucher MD   lisinopriL (PRINIVIL, ZESTRIL) 10 mg tablet TAKE ONE TABLET BY MOUTH ONE TIME DAILY 7/22/20 9/2/20  Micheal Boucher MD   lovastatin (MEVACOR) 20 mg tablet TAKE ONE TABLET BY MOUTH AT BEDTIME 7/22/20 9/2/20  Micheal Boucher MD   metFORMIN ER (GLUCOPHAGE XR) 500 mg tablet TAKE 2 TABLETS BY MOUTH  ONCE DAILY 7/6/20 9/2/20  Micheal Boucher MD          No Known Allergies         Review of Systems   Constitutional: Negative for chills and fever. HENT: Negative for congestion and sore throat. Respiratory: Negative for shortness of breath. Cardiovascular: Negative for chest pain, palpitations and leg swelling. Gastrointestinal: Negative for blood in stool. Genitourinary: Negative for hematuria. Psychiatric/Behavioral: Negative.         Smoker:   Social History     Tobacco Use   Smoking Status Former Smoker    Types: Cigarettes, Pipe    Last attempt to quit: 10/24/2013    Years since quittin.8   Smokeless Tobacco Never Used     ETOH:   Social History     Substance and Sexual Activity   Alcohol Use Yes    Comment: occ       FH:    Family History   Problem Relation Age of Onset    No Known Problems Mother     Cancer Father         prostate           Physical Exam:    Visit Vitals  /74 (BP 1 Location: Left arm, BP Patient Position: Sitting)   Pulse 60   Temp 98.5 °F (36.9 °C) (Oral)   Resp 16   Ht 6' 1\" (1.854 m)   Wt 248 lb 6.4 oz (112.7 kg)   SpO2 97%   BMI 32.77 kg/m²     Physical Exam  Physical Examination: General appearance - alert, well appearing, and in no distress, oriented to person, place, and time and normal appearing weight  Mental status -  normal mood, behavior, speech, dress, motor activity, and thought processes, no expressed suicidal or homicidal ideation, no hallucinations  Ears - bilateral TM's and external ear canals normal  Nose - normal and patent, no erythema, discharge or polyps  Mouth - mucous membranes moist, pharynx normal without lesions  Neck - supple, no significant adenopathy  Chest - normal chest excursion, normal inspiratory and expiratory function. Clear to ausculation bilaterally. Heart - normal rate, regular rhythm, normal S1, S2, no murmurs, rubs, clicks or gallops, no extremity edema  Abdomen- benign, no organomegaly or masses  -normal penis and testicles, 2+ prostate  Skin-no rashes or suspicious moles  Heme negative stool  Neuro normal speech, moves all extremities, normal gait  Musculoskeletal- grossly normal joint and motor function.

## 2020-10-09 ENCOUNTER — TELEPHONE (OUTPATIENT)
Dept: FAMILY MEDICINE CLINIC | Age: 50
End: 2020-10-09

## 2020-10-09 NOTE — TELEPHONE ENCOUNTER
Patient expressing concerns about metformin recall. He tried to contact pharmacy to get the lot number for his particular rx and they told him his was not effected but refused to give him the lot number of his rx.  HE would like to know if there's anything else he could be given as a replacement for metFORMIN ER (GLUCOPHAGE XR) 500 mg tablet     LOV: 09/02/2020

## 2020-10-11 NOTE — TELEPHONE ENCOUNTER
Call patient. His pharmacy should be able to tell him if his metformin is a problem. Not all metformin is from the same manufacturers and some of them are still fine. IF the pharmacy says he needs a different med, I'll get him one.

## 2020-10-12 DIAGNOSIS — E11.9 CONTROLLED TYPE 2 DIABETES MELLITUS WITHOUT COMPLICATION, WITHOUT LONG-TERM CURRENT USE OF INSULIN (HCC): Primary | ICD-10-CM

## 2020-10-12 NOTE — PROGRESS NOTES
10/12/20  4:16 PM    1. Controlled type 2 diabetes mellitus without complication, without long-term current use of insulin (HCC)    - empagliflozin (Jardiance) 10 mg tablet; Take 1 Tab by mouth daily. Dispense: 30 Tab;  Refill: 2      Raffi Franklin MD

## 2020-10-12 NOTE — TELEPHONE ENCOUNTER
Call him back. I sent in a new medication. Should see me in 6 weeks to recheck how blood sugar is doing and to adjust new medication.   RH

## 2020-10-15 ENCOUNTER — TELEPHONE (OUTPATIENT)
Dept: FAMILY MEDICINE CLINIC | Age: 50
End: 2020-10-15

## 2020-10-15 DIAGNOSIS — E11.9 CONTROLLED TYPE 2 DIABETES MELLITUS WITHOUT COMPLICATION, WITHOUT LONG-TERM CURRENT USE OF INSULIN (HCC): Primary | ICD-10-CM

## 2020-10-15 RX ORDER — METFORMIN HYDROCHLORIDE 500 MG/1
500 TABLET ORAL 2 TIMES DAILY WITH MEALS
Qty: 180 TAB | Refills: 1 | Status: SHIPPED | OUTPATIENT
Start: 2020-10-15 | End: 2021-04-22 | Stop reason: SDUPTHER

## 2020-10-15 NOTE — TELEPHONE ENCOUNTER
Patient would like to know if he could do the metformin instant release instead of the extended release. He no longer wishes to do Jardiance .

## 2020-10-15 NOTE — PROGRESS NOTES
10/15/20  3:46 PM    1. Controlled type 2 diabetes mellitus without complication, without long-term current use of insulin ([facility] [address]  DC Jardiance at patient's request  - metFORMIN (GLUCOPHAGE) 500 mg tablet; Take 1 Tab by mouth two (2) times daily (with meals). Dispense: 180 Tab;  Refill: 1      Eliezer Horne MD

## 2021-04-17 DIAGNOSIS — E11.9 CONTROLLED TYPE 2 DIABETES MELLITUS WITHOUT COMPLICATION, WITHOUT LONG-TERM CURRENT USE OF INSULIN (HCC): ICD-10-CM

## 2021-04-19 RX ORDER — METFORMIN HYDROCHLORIDE 500 MG/1
TABLET ORAL
Qty: 180 TAB | Refills: 1 | OUTPATIENT
Start: 2021-04-19

## 2021-04-21 ENCOUNTER — TELEPHONE (OUTPATIENT)
Dept: FAMILY MEDICINE CLINIC | Age: 51
End: 2021-04-21

## 2021-04-21 NOTE — TELEPHONE ENCOUNTER
Reached out to the pt to inform him that he can schedule with any of the providers in the office for he care needs. ----- Message from Cornelia Gonzalez sent at 4/20/2021  5:13 PM EDT -----  Regarding: Dr. Ralph Rosario telephone  General Message/Vendor Calls    Caller's first and last name: Pt      Reason for call: Pt states that he needs an appt to refill, however his is unable to come in before he runs out this Saturday 4/24/21. Pt would like to know what can be done so he does not go without his meds.       Callback required yes/no and why: yes      Best contact number(s):[phone]      Details to clarify the request: N/A      Cornelia Gonzalez

## 2021-04-22 RX ORDER — METFORMIN HYDROCHLORIDE 500 MG/1
500 TABLET ORAL 2 TIMES DAILY WITH MEALS
Qty: 180 TAB | Refills: 1 | Status: SHIPPED | OUTPATIENT
Start: 2021-04-22 | End: 2021-10-19

## 2021-04-27 DIAGNOSIS — Z11.59 ENCOUNTER FOR HEPATITIS C SCREENING TEST FOR LOW RISK PATIENT: ICD-10-CM

## 2021-04-27 DIAGNOSIS — E78.00 HYPERCHOLESTEREMIA: ICD-10-CM

## 2021-04-27 DIAGNOSIS — E11.9 CONTROLLED TYPE 2 DIABETES MELLITUS WITHOUT COMPLICATION, WITHOUT LONG-TERM CURRENT USE OF INSULIN (HCC): Primary | ICD-10-CM

## 2021-04-27 DIAGNOSIS — I10 ESSENTIAL HYPERTENSION: ICD-10-CM

## 2021-04-29 ENCOUNTER — TELEPHONE (OUTPATIENT)
Dept: FAMILY MEDICINE CLINIC | Age: 51
End: 2021-04-29

## 2021-05-05 ENCOUNTER — OFFICE VISIT (OUTPATIENT)
Dept: FAMILY MEDICINE CLINIC | Age: 51
End: 2021-05-05
Payer: COMMERCIAL

## 2021-05-05 VITALS
BODY MASS INDEX: 33.21 KG/M2 | TEMPERATURE: 98.6 F | RESPIRATION RATE: 16 BRPM | SYSTOLIC BLOOD PRESSURE: 115 MMHG | WEIGHT: 250.6 LBS | OXYGEN SATURATION: 100 % | HEIGHT: 73 IN | DIASTOLIC BLOOD PRESSURE: 76 MMHG | HEART RATE: 73 BPM

## 2021-05-05 DIAGNOSIS — E11.9 CONTROLLED TYPE 2 DIABETES MELLITUS WITHOUT COMPLICATION, WITHOUT LONG-TERM CURRENT USE OF INSULIN (HCC): Primary | ICD-10-CM

## 2021-05-05 DIAGNOSIS — I10 ESSENTIAL HYPERTENSION: ICD-10-CM

## 2021-05-05 DIAGNOSIS — E78.00 HYPERCHOLESTEREMIA: ICD-10-CM

## 2021-05-05 PROCEDURE — 99214 OFFICE O/P EST MOD 30 MIN: CPT | Performed by: FAMILY MEDICINE

## 2021-05-05 RX ORDER — LISINOPRIL 10 MG/1
TABLET ORAL
Qty: 90 TAB | Refills: 1 | Status: SHIPPED | OUTPATIENT
Start: 2021-05-05 | End: 2022-03-17

## 2021-05-05 NOTE — PROGRESS NOTES
Identified pt with two pt identifiers(name and ). Reviewed record in preparation for visit and have obtained necessary documentation. Chief Complaint   Patient presents with    Diabetes    Medication Refill        Health Maintenance Due   Topic    Hepatitis C Screening     Eye Exam Retinal or Dilated     COVID-19 Vaccine (1)    Shingrix Vaccine Age 50> (1 of 2)    Colorectal Cancer Screening Combo     Foot Exam Q1        Coordination of Care Questionnaire:  :   1) Have you been to an emergency room, urgent care, or hospitalized since your last visit? If yes, where when, and reason for visit? No       2. Have seen or consulted any other health care provider since your last visit? If yes, where when, and reason for visit?  No

## 2021-05-05 NOTE — PATIENT INSTRUCTIONS

## 2021-05-05 NOTE — PROGRESS NOTES
Major Arellano  46 y.o. male  1970  SYLVIA:[id number]  [facility]  Progress Note     Encounter Date: 5/5/2021    Assessment and Plan:     Encounter Diagnoses     ICD-10-CM ICD-9-CM   1. Controlled type 2 diabetes mellitus without complication, without long-term current use of insulin (HCC)  E11.9 250.00   2. Essential hypertension  I10 401.9   3. Hypercholesteremia  E78.00 272.0       1. Essential hypertension  At goal  - lisinopriL (PRINIVIL, ZESTRIL) 10 mg tablet; TAKE ONE TABLET BY MOUTH ONE TIME DAILY  Dispense: 90 Tab; Refill: 1    2. Controlled type 2 diabetes mellitus without complication, without long-term current use of insulin ([address]  Labs already ordered. Diet again discussed    3. Hypercholesteremia  Continue statin      I have discussed the diagnosis with the patient and the intended plan as seen in the above orders. he has expressed understanding. The patient has received an after-visit summary and questions were answered concerning future plans. I have discussed medication side effects and warnings with the patient as well. Electronically Signed: Shilpa Winters MD    Current Medications after this visit     Current Outpatient Medications   Medication Sig    lisinopriL (PRINIVIL, ZESTRIL) 10 mg tablet TAKE ONE TABLET BY MOUTH ONE TIME DAILY    metFORMIN (GLUCOPHAGE) 500 mg tablet Take 1 Tab by mouth two (2) times daily (with meals).  lovastatin (MEVACOR) 20 mg tablet TAKE ONE TABLET BY MOUTH AT BEDTIME     No current facility-administered medications for this visit.       Medications Discontinued During This Encounter   Medication Reason    lisinopriL (PRINIVIL, ZESTRIL) 10 mg tablet REORDER     ~~~~~~~~~~~~~~~~~~~~~~~~~~~~~~~~~~~~~~~~~~~~~~~~~~~~~~~~~~~    Chief Complaint   Patient presents with    Diabetes    Medication Refill       History provided by patient  History of Present Illness   Major Arellano is a 46 y.o. male who presents to clinic today for:  Diabetes and Medication Refill    DM2  Metformin only  Watching carb intake. No sweet drinks. Drinks shakes with fruit (discussed)  Drinks flavored water. Watching starches. No problems with feet. Eyes up to date. Hypertension  At goal    Hypercholesterolemia  On statin    Health Maintenance  Completed by outside provider. Records requested. Health Maintenance Due   Topic Date Due    Hepatitis C Screening  Never done    Eye Exam Retinal or Dilated  Never done    COVID-19 Vaccine (1) Never done    Shingrix Vaccine Age 50> (1 of 2) Never done    Colorectal Cancer Screening Combo  Never done    Foot Exam Q1  01/13/2021     Review of Systems   Review of Systems   Constitutional: Negative for chills, fever and weight loss. HENT: Negative for congestion and sore throat. Respiratory: Negative for shortness of breath. Cardiovascular: Negative for chest pain and leg swelling. Psychiatric/Behavioral: Negative. Vitals/Objective:     Vitals:    05/05/21 0822   BP: 115/76   Pulse: 73   Resp: 16   Temp: 98.6 °F (37 °C)   TempSrc: Temporal   SpO2: 100%   Weight: 250 lb 9.6 oz (113.7 kg)   Height: 6' 1\" (1.854 m)     Body mass index is 33.06 kg/m². Wt Readings from Last 3 Encounters:   05/05/21 250 lb 9.6 oz (113.7 kg)   09/02/20 248 lb 6.4 oz (112.7 kg)   01/13/20 247 lb 12.8 oz (112.4 kg)         Objective  Physical Exam  Vitals signs and nursing note reviewed. Constitutional:       Appearance: Normal appearance. He is obese. He is not toxic-appearing. HENT:      Head: Normocephalic and atraumatic. Neck:      Musculoskeletal: No muscular tenderness. Cardiovascular:      Rate and Rhythm: Normal rate and regular rhythm. Heart sounds: Normal heart sounds. No murmur. No gallop. Pulmonary:      Effort: Pulmonary effort is normal. No respiratory distress. Breath sounds: Normal breath sounds. No wheezing, rhonchi or rales.    Lymphadenopathy:      Cervical: No cervical adenopathy. Neurological:      Mental Status: He is alert. Psychiatric:         Mood and Affect: Mood normal.         Behavior: Behavior normal.         Thought Content: Thought content normal.         Judgment: Judgment normal.          Diabetic Foot Exam:  Protective sensation is intact bilaterally. Pedal pulses are 2+ and normal bilaterally. L foot skin inspection:  normal skin and soft tissue with no gross edema or evidence of acute injury or foot ulcer  R foot skin inspection:  skin and soft tissue appear normal with no significant edema or evidence of acute injury or foot ulcer     No results found for this or any previous visit (from the past 24 hour(s)). Disposition     Follow-up and Dispositions  ·   Return in about 6 months (around 2021) for Blood pressure follow up, Diabetes follow up. No future appointments. History   Patient's past medical, surgical and family histories were reviewed and updated. Past Medical History:   Diagnosis Date    Diabetes ([address]     Hypercholesteremia     Hypertension     Inguinal hernia     bilateral unrepaired     Past Surgical History:   Procedure Laterality Date    HX COLONOSCOPY  2018    Polyp, repeat in 5 years, Dr. Edwar Chisholm.      Family History   Problem Relation Age of Onset    No Known Problems Mother     Cancer Father         prostate     Social History     Tobacco Use    Smoking status: Former Smoker     Types: Cigarettes, Pipe     Quit date: 10/24/2013     Years since quittin.5    Smokeless tobacco: Never Used   Substance Use Topics    Alcohol use: Yes     Comment: occ    Drug use: Never       Allergies   No Known Allergies

## 2021-05-06 LAB
ALBUMIN SERPL-MCNC: 5 G/DL (ref 3.8–4.9)
ALBUMIN/CREAT UR: 4 MG/G CREAT (ref 0–29)
ALP SERPL-CCNC: 74 IU/L (ref 39–117)
ALT SERPL-CCNC: 26 IU/L (ref 0–44)
AST SERPL-CCNC: 22 IU/L (ref 0–40)
BASOPHILS # BLD AUTO: 0.1 X10E3/UL (ref 0–0.2)
BASOPHILS NFR BLD AUTO: 1 %
BILIRUB DIRECT SERPL-MCNC: 0.09 MG/DL (ref 0–0.4)
BILIRUB SERPL-MCNC: 0.2 MG/DL (ref 0–1.2)
BUN SERPL-MCNC: 14 MG/DL (ref 6–24)
BUN/CREAT SERPL: 17 (ref 9–20)
CALCIUM SERPL-MCNC: 9.1 MG/DL (ref 8.7–10.2)
CHLORIDE SERPL-SCNC: 103 MMOL/L (ref 96–106)
CHOLEST SERPL-MCNC: 141 MG/DL (ref 100–199)
CO2 SERPL-SCNC: 24 MMOL/L (ref 20–29)
CREAT SERPL-MCNC: 0.84 MG/DL (ref 0.76–1.27)
CREAT UR-MCNC: 118.9 MG/DL
EOSINOPHIL # BLD AUTO: 0.1 X10E3/UL (ref 0–0.4)
EOSINOPHIL NFR BLD AUTO: 1 %
ERYTHROCYTE [DISTWIDTH] IN BLOOD BY AUTOMATED COUNT: 12.6 % (ref 11.6–15.4)
EST. AVERAGE GLUCOSE BLD GHB EST-MCNC: 148 MG/DL
GLUCOSE SERPL-MCNC: 155 MG/DL (ref 65–99)
HBA1C MFR BLD: 6.8 % (ref 4.8–5.6)
HCT VFR BLD AUTO: 43.4 % (ref 37.5–51)
HCV AB S/CO SERPL IA: <0.1 S/CO RATIO (ref 0–0.9)
HDLC SERPL-MCNC: 42 MG/DL
HGB BLD-MCNC: 15 G/DL (ref 13–17.7)
IMM GRANULOCYTES # BLD AUTO: 0 X10E3/UL (ref 0–0.1)
IMM GRANULOCYTES NFR BLD AUTO: 0 %
LDLC SERPL CALC-MCNC: 70 MG/DL (ref 0–99)
LYMPHOCYTES # BLD AUTO: 1.8 X10E3/UL (ref 0.7–3.1)
LYMPHOCYTES NFR BLD AUTO: 29 %
MCH RBC QN AUTO: 32.3 PG (ref 26.6–33)
MCHC RBC AUTO-ENTMCNC: 34.6 G/DL (ref 31.5–35.7)
MCV RBC AUTO: 93 FL (ref 79–97)
MICROALBUMIN UR-MCNC: 5.1 UG/ML
MONOCYTES # BLD AUTO: 0.6 X10E3/UL (ref 0.1–0.9)
MONOCYTES NFR BLD AUTO: 10 %
NEUTROPHILS # BLD AUTO: 3.6 X10E3/UL (ref 1.4–7)
NEUTROPHILS NFR BLD AUTO: 59 %
PLATELET # BLD AUTO: 222 X10E3/UL (ref 150–450)
POTASSIUM SERPL-SCNC: 5.1 MMOL/L (ref 3.5–5.2)
PROT SERPL-MCNC: 7.2 G/DL (ref 6–8.5)
RBC # BLD AUTO: 4.65 X10E6/UL (ref 4.14–5.8)
SODIUM SERPL-SCNC: 142 MMOL/L (ref 134–144)
TRIGL SERPL-MCNC: 169 MG/DL (ref 0–149)
VLDLC SERPL CALC-MCNC: 29 MG/DL (ref 5–40)
WBC # BLD AUTO: 6.2 X10E3/UL (ref 3.4–10.8)

## 2021-10-19 DIAGNOSIS — E11.9 CONTROLLED TYPE 2 DIABETES MELLITUS WITHOUT COMPLICATION, WITHOUT LONG-TERM CURRENT USE OF INSULIN (HCC): ICD-10-CM

## 2021-10-19 RX ORDER — METFORMIN HYDROCHLORIDE 500 MG/1
TABLET ORAL
Qty: 180 TABLET | Refills: 1 | Status: SHIPPED | OUTPATIENT
Start: 2021-10-19 | End: 2022-03-17 | Stop reason: SDUPTHER

## 2022-03-17 ENCOUNTER — TELEPHONE (OUTPATIENT)
Dept: FAMILY MEDICINE CLINIC | Age: 52
End: 2022-03-17

## 2022-03-17 DIAGNOSIS — I10 ESSENTIAL HYPERTENSION: ICD-10-CM

## 2022-03-17 DIAGNOSIS — E78.00 HYPERCHOLESTEREMIA: ICD-10-CM

## 2022-03-17 DIAGNOSIS — E11.9 CONTROLLED TYPE 2 DIABETES MELLITUS WITHOUT COMPLICATION, WITHOUT LONG-TERM CURRENT USE OF INSULIN (HCC): ICD-10-CM

## 2022-03-17 RX ORDER — LISINOPRIL 10 MG/1
10 TABLET ORAL DAILY
Qty: 90 TABLET | Refills: 0 | Status: SHIPPED | OUTPATIENT
Start: 2022-03-17 | End: 2022-09-07 | Stop reason: SDUPTHER

## 2022-03-17 RX ORDER — METFORMIN HYDROCHLORIDE 500 MG/1
500 TABLET ORAL 2 TIMES DAILY WITH MEALS
Qty: 180 TABLET | Refills: 0 | Status: SHIPPED | OUTPATIENT
Start: 2022-03-17 | End: 2022-09-07 | Stop reason: SDUPTHER

## 2022-03-17 RX ORDER — LOVASTATIN 20 MG/1
20 TABLET ORAL
Qty: 90 TABLET | Refills: 0 | Status: SHIPPED | OUTPATIENT
Start: 2022-03-17 | End: 2022-09-07 | Stop reason: SDUPTHER

## 2022-03-17 NOTE — TELEPHONE ENCOUNTER
Pt is moving to [place] and is unable to continue coming to this office. He is asking Dr. Danyell James for a referral for a new PCP in [place].  Pt was notified of his last rx refill and that we will not be filling future prescriptions without an appt per Dr. Danyell James     Pt phone number: [phone]      [facility]

## 2022-03-17 NOTE — TELEPHONE ENCOUNTER
Call him. I really don't know anyone in that community that he could see. He should see who is covered by his insurer and how their background looks on the internet. I will send in 90 days of all of his meds to cover him until he finds a new doctor.   [facility]

## 2022-03-19 PROBLEM — I10 HYPERTENSION: Status: ACTIVE | Noted: 2018-10-24

## 2022-03-19 PROBLEM — E11.9 DIABETES MELLITUS TYPE 2, CONTROLLED (HCC): Status: ACTIVE | Noted: 2018-10-24

## 2022-03-19 PROBLEM — E78.00 HYPERCHOLESTEREMIA: Status: ACTIVE | Noted: 2019-06-03

## 2022-03-19 PROBLEM — L71.9 ROSACEA: Status: ACTIVE | Noted: 2018-10-24

## 2022-04-13 ENCOUNTER — TELEPHONE (OUTPATIENT)
Dept: FAMILY MEDICINE CLINIC | Age: 52
End: 2022-04-13

## 2022-09-07 DIAGNOSIS — E78.00 HYPERCHOLESTEREMIA: ICD-10-CM

## 2022-09-07 DIAGNOSIS — I10 ESSENTIAL HYPERTENSION: ICD-10-CM

## 2022-09-07 DIAGNOSIS — E11.9 CONTROLLED TYPE 2 DIABETES MELLITUS WITHOUT COMPLICATION, WITHOUT LONG-TERM CURRENT USE OF INSULIN (HCC): ICD-10-CM

## 2022-09-07 RX ORDER — LOVASTATIN 20 MG/1
20 TABLET ORAL
Qty: 30 TABLET | Refills: 0 | Status: SHIPPED | OUTPATIENT
Start: 2022-09-07

## 2022-09-07 RX ORDER — METFORMIN HYDROCHLORIDE 500 MG/1
500 TABLET ORAL 2 TIMES DAILY WITH MEALS
Qty: 60 TABLET | Refills: 0 | Status: SHIPPED | OUTPATIENT
Start: 2022-09-07

## 2022-09-07 RX ORDER — LISINOPRIL 10 MG/1
10 TABLET ORAL DAILY
Qty: 30 TABLET | Refills: 0 | Status: SHIPPED | OUTPATIENT
Start: 2022-09-07 | End: 2022-09-22

## 2022-09-07 NOTE — TELEPHONE ENCOUNTER
Please advise!!    .PCP: Mina Lee MD    Last appt: 5/5/2021  No future appointments.     Requested Prescriptions      No prescriptions requested or ordered in this encounter       Prior labs and Blood pressures:  BP Readings from Last 3 Encounters:   05/05/21 115/76   09/02/20 117/74   01/13/20 136/87     Lab Results   Component Value Date/Time    Sodium 142 05/05/2021 12:00 AM    Potassium 5.1 05/05/2021 12:00 AM    Chloride 103 05/05/2021 12:00 AM    CO2 24 05/05/2021 12:00 AM    Glucose 155 (H) 05/05/2021 12:00 AM    BUN 14 05/05/2021 12:00 AM    Creatinine 0.84 05/05/2021 12:00 AM    BUN/Creatinine ratio 17 05/05/2021 12:00 AM    GFR est  05/05/2021 12:00 AM    GFR est non- 05/05/2021 12:00 AM    Calcium 9.1 05/05/2021 12:00 AM     Lab Results   Component Value Date/Time    Hemoglobin A1c 6.8 (H) 05/05/2021 12:00 AM     Lab Results   Component Value Date/Time    Cholesterol, total 141 05/05/2021 12:00 AM    HDL Cholesterol 42 05/05/2021 12:00 AM    LDL, calculated 70 05/05/2021 12:00 AM    LDL, calculated 99 08/26/2020 12:00 AM    VLDL, calculated 29 05/05/2021 12:00 AM    VLDL, calculated 49 (H) 08/26/2020 12:00 AM    Triglyceride 169 (H) 05/05/2021 12:00 AM     No results found for: Josesito Whaley [id number]    Lab Results   Component Value Date/Time    TSH 1.810 05/29/2019 09:21 AM